# Patient Record
Sex: MALE | Race: WHITE | NOT HISPANIC OR LATINO | Employment: FULL TIME | URBAN - METROPOLITAN AREA
[De-identification: names, ages, dates, MRNs, and addresses within clinical notes are randomized per-mention and may not be internally consistent; named-entity substitution may affect disease eponyms.]

---

## 2020-03-02 ENCOUNTER — OFFICE VISIT (OUTPATIENT)
Dept: OPHTHALMOLOGY | Facility: CLINIC | Age: 40
End: 2020-03-02
Payer: COMMERCIAL

## 2020-03-02 DIAGNOSIS — Q89.8 STICKLER'S SYNDROME: ICD-10-CM

## 2020-03-02 DIAGNOSIS — H04.123 DRY EYE SYNDROME OF BOTH EYES: ICD-10-CM

## 2020-03-02 DIAGNOSIS — H40.32X3 GLAUCOMA OF LEFT EYE ASSOCIATED WITH OCULAR TRAUMA, SEVERE STAGE: Primary | ICD-10-CM

## 2020-03-02 DIAGNOSIS — Z96.1 PSEUDOPHAKIA: ICD-10-CM

## 2020-03-02 DIAGNOSIS — Z86.69 HISTORY OF RETINAL DETACHMENT: ICD-10-CM

## 2020-03-02 DIAGNOSIS — Z91.199 CURRENT NON-ADHERENCE TO MEDICAL TREATMENT: ICD-10-CM

## 2020-03-02 DIAGNOSIS — H54.10 BLINDNESS AND LOW VISION: ICD-10-CM

## 2020-03-02 PROCEDURE — 92020 GONIOSCOPY: CPT | Mod: S$GLB,,, | Performed by: OPHTHALMOLOGY

## 2020-03-02 PROCEDURE — 99999 PR PBB SHADOW E&M-NEW PATIENT-LVL II: CPT | Mod: PBBFAC,,, | Performed by: OPHTHALMOLOGY

## 2020-03-02 PROCEDURE — 92004 COMPRE OPH EXAM NEW PT 1/>: CPT | Mod: S$GLB,,, | Performed by: OPHTHALMOLOGY

## 2020-03-02 PROCEDURE — 92004 PR EYE EXAM, NEW PATIENT,COMPREHESV: ICD-10-PCS | Mod: S$GLB,,, | Performed by: OPHTHALMOLOGY

## 2020-03-02 PROCEDURE — 99999 PR PBB SHADOW E&M-NEW PATIENT-LVL II: ICD-10-PCS | Mod: PBBFAC,,, | Performed by: OPHTHALMOLOGY

## 2020-03-02 PROCEDURE — 92020 PR SPECIAL EYE EVAL,GONIOSCOPY: ICD-10-PCS | Mod: S$GLB,,, | Performed by: OPHTHALMOLOGY

## 2020-03-02 RX ORDER — BRIMONIDINE TARTRATE 2 MG/ML
1 SOLUTION/ DROPS OPHTHALMIC 2 TIMES DAILY
COMMUNITY
End: 2020-03-02

## 2020-03-02 RX ORDER — DORZOLAMIDE HYDROCHLORIDE AND TIMOLOL MALEATE 20; 5 MG/ML; MG/ML
1 SOLUTION/ DROPS OPHTHALMIC 2 TIMES DAILY
COMMUNITY
End: 2020-03-02 | Stop reason: SDUPTHER

## 2020-03-02 RX ORDER — BRIMONIDINE TARTRATE 2 MG/ML
1 SOLUTION/ DROPS OPHTHALMIC 2 TIMES DAILY
Qty: 30 ML | Refills: 4 | Status: SHIPPED | OUTPATIENT
Start: 2020-03-02 | End: 2020-08-26 | Stop reason: SDUPTHER

## 2020-03-02 RX ORDER — DORZOLAMIDE HYDROCHLORIDE AND TIMOLOL MALEATE 20; 5 MG/ML; MG/ML
1 SOLUTION/ DROPS OPHTHALMIC 2 TIMES DAILY
Qty: 30 ML | Refills: 4 | Status: SHIPPED | OUTPATIENT
Start: 2020-03-02 | End: 2020-08-26 | Stop reason: SDUPTHER

## 2020-03-02 NOTE — PROGRESS NOTES
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 (History)        Wife works at [employer]    Lost to FU 1/12/2016 --> 3/2/2020  No Drops x months  Discussed adherence  IOP & Blindness  Need for fu    Stickler Syndrome  Hx RD OU    OD: 2 yo with RD repair / amblyopia --> sp CE IOL elsewhere  OS: RD x 2 with PPVx / SB and SO --> re-detatches without SO  SO in place      Right eye --> not taking for months 9/21/2015  Alphagan BID  Combigan BID  Danielito q HS   Methazolamide 50 mg BID --> Improve tolerability --> no SOB / No Fatigue   Went back to Diamox 500 BID --> tolerating better --> off    PC IOL OU  Quiet  PCO OU  Observe as discussed      Left eye  ST BV / PPG with LMA 7/2/2015   Heavier conj scarring INF --> bound down ST as well  Anticipated tube opening 8/06/2015 --> opened 8/7/2015      Left eye --> restart  Alphagan BID  Cosopt BID  Danielito q HS --> Holding --> can add back  PF1% q day --> holding ? Steroid response ?  Diamox 500 BID --> Holding --> intolerant  A1% BID --> holding      Dry Eye Syndrome: discussed use of warm compresses, preserved & non-preserved artificial tears, gel and PM ointment options.  Also discussed options utilizing medications.        Plan  RTC 1 month IOP and re-establish care with retina service  RTC --> re-establish care with PCP / Tan  RTC sooner prn with good understanding

## 2020-04-15 ENCOUNTER — TELEPHONE (OUTPATIENT)
Dept: OPHTHALMOLOGY | Facility: CLINIC | Age: 40
End: 2020-04-15

## 2020-04-15 NOTE — TELEPHONE ENCOUNTER
----- Message from Denise Melendez sent at 4/15/2020  3:10 PM CDT -----  Contact: Soha (Mother)  Pt mother calling on behalf of her son to get a copy of the last visit summary emailed to him for insurance purposes.  The email is [email or web address].  If we are unable to email to him please contact the mother at [phone].  I informed that I would send a link for him to register on the [facility] as well.

## 2020-08-03 RX ORDER — VALACYCLOVIR HYDROCHLORIDE 1 G/1
1000 TABLET, FILM COATED ORAL 2 TIMES DAILY
Qty: 10 TABLET | Refills: 3 | Status: SHIPPED | OUTPATIENT
Start: 2020-08-03 | End: 2020-08-08

## 2020-08-26 ENCOUNTER — OFFICE VISIT (OUTPATIENT)
Dept: OPHTHALMOLOGY | Facility: CLINIC | Age: 40
End: 2020-08-26
Payer: COMMERCIAL

## 2020-08-26 DIAGNOSIS — H26.493 PCO (POSTERIOR CAPSULAR OPACIFICATION), BILATERAL: ICD-10-CM

## 2020-08-26 DIAGNOSIS — Z86.69 HISTORY OF RETINAL DETACHMENT: ICD-10-CM

## 2020-08-26 DIAGNOSIS — Z91.199 CURRENT NON-ADHERENCE TO MEDICAL TREATMENT: ICD-10-CM

## 2020-08-26 DIAGNOSIS — H40.32X3 GLAUCOMA OF LEFT EYE ASSOCIATED WITH OCULAR TRAUMA, SEVERE STAGE: ICD-10-CM

## 2020-08-26 DIAGNOSIS — Q89.8 STICKLER'S SYNDROME: Primary | ICD-10-CM

## 2020-08-26 DIAGNOSIS — H59.819 CHORIORETINAL SCAR AFTER RETINAL DETACHMENT SURGERY: ICD-10-CM

## 2020-08-26 PROCEDURE — 92134 CPTRZ OPH DX IMG PST SGM RTA: CPT | Mod: S$GLB,,, | Performed by: OPHTHALMOLOGY

## 2020-08-26 PROCEDURE — 99999 PR PBB SHADOW E&M-EST. PATIENT-LVL III: ICD-10-PCS | Mod: PBBFAC,,, | Performed by: OPHTHALMOLOGY

## 2020-08-26 PROCEDURE — 92201 OPSCPY EXTND RTA DRAW UNI/BI: CPT | Mod: S$GLB,,, | Performed by: OPHTHALMOLOGY

## 2020-08-26 PROCEDURE — 99999 PR PBB SHADOW E&M-EST. PATIENT-LVL III: CPT | Mod: PBBFAC,,, | Performed by: OPHTHALMOLOGY

## 2020-08-26 PROCEDURE — 92014 COMPRE OPH EXAM EST PT 1/>: CPT | Mod: S$GLB,,, | Performed by: OPHTHALMOLOGY

## 2020-08-26 PROCEDURE — 92014 PR EYE EXAM, EST PATIENT,COMPREHESV: ICD-10-PCS | Mod: S$GLB,,, | Performed by: OPHTHALMOLOGY

## 2020-08-26 PROCEDURE — 92134 POSTERIOR SEGMENT OCT RETINA (OCULAR COHERENCE TOMOGRAPHY)-BOTH EYES: ICD-10-PCS | Mod: S$GLB,,, | Performed by: OPHTHALMOLOGY

## 2020-08-26 PROCEDURE — 92201 PR OPHTHALMOSCOPY, EXT, W/RET DRAW/SCLERAL DEPR, I&R, UNI/BI: ICD-10-PCS | Mod: S$GLB,,, | Performed by: OPHTHALMOLOGY

## 2020-08-26 RX ORDER — BRIMONIDINE TARTRATE 2 MG/ML
1 SOLUTION/ DROPS OPHTHALMIC 2 TIMES DAILY
Qty: 30 ML | Refills: 4 | Status: SHIPPED | OUTPATIENT
Start: 2020-08-26 | End: 2023-07-31 | Stop reason: SDUPTHER

## 2020-08-26 RX ORDER — DORZOLAMIDE HYDROCHLORIDE AND TIMOLOL MALEATE 20; 5 MG/ML; MG/ML
1 SOLUTION/ DROPS OPHTHALMIC 2 TIMES DAILY
Qty: 30 ML | Refills: 4 | Status: SHIPPED | OUTPATIENT
Start: 2020-08-26 | End: 2021-11-22

## 2020-08-26 NOTE — LETTER
August 27, 2020      Heber Ferris MD  [address]  [facility] [address]           [facility] [address]  [address]  [facility] [address]  Phone: [phone]  Fax: [phone]          Patient: Gagandeep Boateng   MR Number: [id number]   YOB: 1980   Date of Visit: 8/26/2020       Dear Dr. Heber Ferris:    Thank you for referring Gagandeep Boateng to me for evaluation. Attached you will find relevant portions of my assessment and plan of care.    If you have questions, please do not hesitate to call me. I look forward to following Gagandeep Boateng along with you.    Sincerely,    Christopher Griffin MD    Enclosure  CC:  No Recipients    If you would like to receive this communication electronically, please contact [email or web address] or [phone] to request more information on [employer] access.    For providers and/or their staff who would like to refer a patient to [facility], please contact us through our one-stop-shop provider referral line, [facility], at [phone].    If you feel you have received this communication in error or would no longer like to receive these types of communications, please e-mail [email or web address]

## 2020-08-27 NOTE — PROGRESS NOTES
Subjective:       Patient ID: Gagandeep Boateng is a 40 y.o. male      Chief Complaint   Patient presents with    Concerns About Ocular Health    Referral     History of Present Illness  HPI     Retinal eval per Dr. Ferris   DLS-03/02/20 Dr. Ferris      Pt sts it has been about 2 or more years since seeing Retinal specialist   (Dr. Juárez) due to insurance changes.  Vision declining since last Retinal visit. Since Last [place] visit no   changes.   OS more glare and blurrier/worse vision in last years.  OS was better eye.    Cataract surgery OD improved vision somewhat in OD.   Now OD better-seeing eye for about 5 years  Denies pain, occasional headache.     (-)Flashes (+)Floaters rarely   (-)Photophobia  (+)Glare Mostly notices in OS       Gtts:  Alphagan BID OS  (Dr. Ferris following)  Cosopt BID OS  No gtts OD    [employer]  20 years          OD: 2 yo with RD repair / amblyopia with dense cataract  OS: RD x 2 with PPVx and SO --> re-detatches without SO   Seen/followed by Dr Eulalia Painter and Danny Martel    Stickler's syndrome  Pseudophakia  Nuclear cataract, right  Blindness and low vision  Glaucoma shunt device of left eye  Dry eye syndrome, bilateral    Last edited by Christopher Griffin MD on 8/26/2020 10:33 AM. (History)        Imaging:    See report    Assessment/Plan:         1. Stickler's syndrome  2. History of retinal detachment  3. 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  OD surgery age 3.  Previously better eye  OS attached with SO.  Will address with Dr Juárez to ensure notes correct about requiring SO OS  IOP elevated.  Not taking meds  Has early emulsifacation of SO.  May need SOR or exchange    4. Glaucoma shunt device of left eye  5. Current non-adherence to medical treatment  6. 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  Restart gtts.  If IOP acceptable next visit, will observe  If not consider  SOR or exchange, capsulotomy and gl procedure    - dorzolamide-timolol 2-0.5% (COSOPT) 22.3-6.8 mg/mL ophthalmic solution; Place 1 drop into the left eye 2 (two) times daily.  Dispense: 30 mL; Refill: 4  - brimonidine 0.2% (ALPHAGAN) 0.2 % Drop; Place 1 drop into the left eye 2 (two) times daily.  Dispense: 30 mL; Refill: 4    7. PCO OS>OD  May be contributing to poor vision OS.  Could consider YAG vs surgical capsulotomy    Follow up in about 1 week (around 9/2/2020), or if symptoms worsen or fail to improve, for Dilated examination, IOP checkc.

## 2020-09-03 ENCOUNTER — OFFICE VISIT (OUTPATIENT)
Dept: OPHTHALMOLOGY | Facility: CLINIC | Age: 40
End: 2020-09-03
Payer: COMMERCIAL

## 2020-09-03 DIAGNOSIS — H59.819 CHORIORETINAL SCAR AFTER RETINAL DETACHMENT SURGERY: ICD-10-CM

## 2020-09-03 DIAGNOSIS — H26.493 PCO (POSTERIOR CAPSULAR OPACIFICATION), BILATERAL: ICD-10-CM

## 2020-09-03 DIAGNOSIS — H40.32X3 GLAUCOMA OF LEFT EYE ASSOCIATED WITH OCULAR TRAUMA, SEVERE STAGE: Primary | ICD-10-CM

## 2020-09-03 DIAGNOSIS — Q89.8 STICKLER'S SYNDROME: ICD-10-CM

## 2020-09-03 DIAGNOSIS — Z86.69 HISTORY OF RETINAL DETACHMENT: ICD-10-CM

## 2020-09-03 DIAGNOSIS — Z91.199 CURRENT NON-ADHERENCE TO MEDICAL TREATMENT: ICD-10-CM

## 2020-09-03 PROCEDURE — 92012 INTRM OPH EXAM EST PATIENT: CPT | Mod: S$GLB,,, | Performed by: OPHTHALMOLOGY

## 2020-09-03 PROCEDURE — 99999 PR PBB SHADOW E&M-EST. PATIENT-LVL III: CPT | Mod: PBBFAC,,, | Performed by: OPHTHALMOLOGY

## 2020-09-03 PROCEDURE — 99999 PR PBB SHADOW E&M-EST. PATIENT-LVL III: ICD-10-PCS | Mod: PBBFAC,,, | Performed by: OPHTHALMOLOGY

## 2020-09-03 PROCEDURE — 92012 PR EYE EXAM, EST PATIENT,INTERMED: ICD-10-PCS | Mod: S$GLB,,, | Performed by: OPHTHALMOLOGY

## 2020-09-04 NOTE — PROGRESS NOTES
Subjective:       Patient ID: Gagandeep Boateng is a 40 y.o. male      Chief Complaint   Patient presents with    Concerns About Ocular Health     History of Present Illness  HPI     DLS-08/26/20 Dr. Griffin     Pt states that since last week vision is the same.  OS is less red and   intermittent ache is resolved.  Has bee using gtts consistently.    NO eye pain,Flashes (+)Floaters rarely     Gtts:  Alphagan BID OS  Cosopt BID OS          OD: 4 yo with RD repair / amblyopia with dense cataract  OS: RD x 2 with PPVx and SO --> re-detatches without SO   Seen/followed by Dr Eulalia Painter and Danny Martel    Stickler's syndrome  Pseudophakia  Nuclear cataract, right  Blindness and low vision  Glaucoma shunt device of left eye  Dry eye syndrome, bilateral    Last edited by Christopher Griffin MD on 9/4/2020  8:41 AM. (History)        Imaging:    See report    Assessment/Plan:         1. Stickler's syndrome  2. History of retinal detachment  3. 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  OD surgery age 3.  Previously better eye  OS attached with SO.  Will address with Dr Juárez to ensure notes correct about requiring SO OS if SOR becomes necessary  IOP improved now that back on gtts.    Has early emulsifacation of SO.  May need SOR or exchange in future    4. Glaucoma shunt device of left eye  5. Current non-adherence to medical treatment  6. 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  Dispense: 30 mL; Refill: 4  - brimonidine 0.2% (ALPHAGAN) 0.2 % Drop; Place 1 drop into the left eye 2 (two) times daily.  Dispense: 30 mL; Refill: 4    7. PCO OS>OD  May be contributing to poor vision OS.  Could consider YAG vs surgical  capsulotomy    Keep f/u with JDN also    Follow up in about 4 months (around 1/3/2021), or if symptoms worsen or fail to improve, for Comprehensive Examination, OCT Mac.

## 2021-04-16 ENCOUNTER — PATIENT MESSAGE (OUTPATIENT)
Dept: RESEARCH | Facility: HOSPITAL | Age: 41
End: 2021-04-16

## 2021-12-08 ENCOUNTER — TELEPHONE (OUTPATIENT)
Dept: ADMINISTRATIVE | Facility: OTHER | Age: 41
End: 2021-12-08
Payer: COMMERCIAL

## 2023-07-17 ENCOUNTER — TELEPHONE (OUTPATIENT)
Dept: OPHTHALMOLOGY | Facility: CLINIC | Age: 43
End: 2023-07-17
Payer: MEDICARE

## 2023-07-31 ENCOUNTER — OFFICE VISIT (OUTPATIENT)
Dept: OPHTHALMOLOGY | Facility: CLINIC | Age: 43
End: 2023-07-31
Payer: COMMERCIAL

## 2023-07-31 DIAGNOSIS — Z86.69 HISTORY OF RETINAL DETACHMENT: ICD-10-CM

## 2023-07-31 DIAGNOSIS — Q89.8 STICKLER'S SYNDROME: Primary | ICD-10-CM

## 2023-07-31 DIAGNOSIS — H59.813 CHORIORETINAL SCAR OF BOTH EYES AFTER SURGERY FOR DETACHMENT: ICD-10-CM

## 2023-07-31 DIAGNOSIS — H26.493 PCO (POSTERIOR CAPSULAR OPACIFICATION), BILATERAL: ICD-10-CM

## 2023-07-31 DIAGNOSIS — H40.32X3 GLAUCOMA OF LEFT EYE ASSOCIATED WITH OCULAR TRAUMA, SEVERE STAGE: ICD-10-CM

## 2023-07-31 DIAGNOSIS — Z91.199 CURRENT NON-ADHERENCE TO MEDICAL TREATMENT: ICD-10-CM

## 2023-07-31 PROCEDURE — 92201 OPSCPY EXTND RTA DRAW UNI/BI: CPT | Mod: S$PBB,,, | Performed by: OPHTHALMOLOGY

## 2023-07-31 PROCEDURE — 99999 PR PBB SHADOW E&M-EST. PATIENT-LVL III: CPT | Mod: PBBFAC,,, | Performed by: OPHTHALMOLOGY

## 2023-07-31 PROCEDURE — 99214 PR OFFICE/OUTPT VISIT, EST, LEVL IV, 30-39 MIN: ICD-10-PCS | Mod: S$PBB,,, | Performed by: OPHTHALMOLOGY

## 2023-07-31 PROCEDURE — 92134 CPTRZ OPH DX IMG PST SGM RTA: CPT | Mod: PBBFAC | Performed by: OPHTHALMOLOGY

## 2023-07-31 PROCEDURE — 99999 PR PBB SHADOW E&M-EST. PATIENT-LVL III: ICD-10-PCS | Mod: PBBFAC,,, | Performed by: OPHTHALMOLOGY

## 2023-07-31 PROCEDURE — 92134 POSTERIOR SEGMENT OCT RETINA (OCULAR COHERENCE TOMOGRAPHY)-BOTH EYES: ICD-10-PCS | Mod: 26,S$PBB,, | Performed by: OPHTHALMOLOGY

## 2023-07-31 PROCEDURE — 99214 OFFICE O/P EST MOD 30 MIN: CPT | Mod: S$PBB,,, | Performed by: OPHTHALMOLOGY

## 2023-07-31 PROCEDURE — 92201 OPSCPY EXTND RTA DRAW UNI/BI: CPT | Mod: PBBFAC | Performed by: OPHTHALMOLOGY

## 2023-07-31 PROCEDURE — 92201 PR OPHTHALMOSCOPY, EXT, W/RET DRAW/SCLERAL DEPR, I&R, UNI/BI: ICD-10-PCS | Mod: S$PBB,,, | Performed by: OPHTHALMOLOGY

## 2023-07-31 PROCEDURE — 99213 OFFICE O/P EST LOW 20 MIN: CPT | Mod: PBBFAC | Performed by: OPHTHALMOLOGY

## 2023-07-31 RX ORDER — DORZOLAMIDE HYDROCHLORIDE AND TIMOLOL MALEATE 20; 5 MG/ML; MG/ML
SOLUTION/ DROPS OPHTHALMIC
Qty: 30 ML | Refills: 4 | Status: SHIPPED | OUTPATIENT
Start: 2023-07-31

## 2023-07-31 RX ORDER — BRIMONIDINE TARTRATE 2 MG/ML
1 SOLUTION/ DROPS OPHTHALMIC 2 TIMES DAILY
Qty: 30 ML | Refills: 4 | Status: SHIPPED | OUTPATIENT
Start: 2023-07-31

## 2023-08-01 NOTE — PROGRESS NOTES
Subjective:       Patient ID: Gagandeep Boateng is a 43 y.o. male      Chief Complaint   Patient presents with    Follow-up     History of Present Illness  HPI    DLS-09/03/2020 Dr. Griffin     Pt c/o cloudy vision at a distance. Patient also having trouble with   reading things. But this is longstanding problem without significant   change    Gtts:  None for several months    OD: 4 yo with RD repair / amblyopia with dense cataract  OS: RD x 2 with PPVx and SO --> re-detatches without SO  Previously seen/followed by Dr Eulalia Painter and Danny Martel    Stickler's syndrome  Pseudophakia  Nuclear cataract, right  Blindness and low vision  Glaucoma shunt device of left eye  Dry eye syndrome, bilateral  Last edited by Christopher Griffin MD on 8/1/2023  1:15 PM.        Imaging:    See report    Assessment/Plan:     1. Stickler's syndrome  2. History of retinal detachment  3. 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  OD surgery age 3.  Previously better eye  OS attached with SO.  Per Dr Juárez's notes requires SO OS to remain attached  Has early emulsifacation of SO.  May need SOR or exchange in future    4. Glaucoma shunt device of left eye  5. Current non-adherence to medical treatment  6. 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  Rx sent to pharmacy    Can consider SOR or exchange, capsulotomy and gl procedure if IOP not controlled  CPM with gtts    - dorzolamide-timolol 2-0.5% (COSOPT) 22.3-6.8 mg/mL ophthalmic solution; INSTILL ONE DROP IN THE LEFT EYE TWO TIMES A DAY  Dispense: 30 mL; Refill: 4  - brimonidine 0.2% (ALPHAGAN) 0.2 % Drop; Place 1 drop into the left eye 2 (two) times daily.  Dispense: 30 mL; Refill: 4    7. PCO OS>OD  May be contributing to poor vision OS.    Can no longer get OCT OS and view decreasing  Unclear how much PCO contributes  Could consider YAG vs  surgical capsulotomy    F/u with [facility] to eval IOP after restarting gtts/eval for any other possible intervention since patient not compliant with gtts or f/u and comes in with high pressures    Follow up in about 6 months (around 1/31/2024), or if symptoms worsen or fail to improve, for Comprehensive Examination, OCT Mac.

## 2024-03-22 ENCOUNTER — HOSPITAL ENCOUNTER (EMERGENCY)
Facility: HOSPITAL | Age: 44
Discharge: HOME | End: 2024-03-22
Attending: EMERGENCY MEDICINE
Payer: MEDICARE

## 2024-03-22 VITALS
DIASTOLIC BLOOD PRESSURE: 86 MMHG | HEART RATE: 73 BPM | WEIGHT: 190 LBS | HEIGHT: 74 IN | BODY MASS INDEX: 24.38 KG/M2 | TEMPERATURE: 98 F | SYSTOLIC BLOOD PRESSURE: 152 MMHG | OXYGEN SATURATION: 100 % | RESPIRATION RATE: 20 BRPM

## 2024-03-22 DIAGNOSIS — R07.9 CHEST PAIN: ICD-10-CM

## 2024-03-22 LAB
ALBUMIN SERPL BCP-MCNC: 4.5 G/DL (ref 3.5–5.2)
ALP SERPL-CCNC: 53 U/L (ref 55–135)
ALT SERPL W/O P-5'-P-CCNC: 18 U/L (ref 10–44)
ANION GAP SERPL CALC-SCNC: 9 MMOL/L (ref 8–16)
AST SERPL-CCNC: 27 U/L (ref 10–40)
BASOPHILS # BLD AUTO: 0.06 K/UL (ref 0–0.2)
BASOPHILS NFR BLD: 0.4 % (ref 0–1.9)
BILIRUB SERPL-MCNC: 1.4 MG/DL (ref 0.1–1)
BNP SERPL-MCNC: 14 PG/ML (ref 0–99)
BUN SERPL-MCNC: 14 MG/DL (ref 6–20)
CALCIUM SERPL-MCNC: 9.7 MG/DL (ref 8.7–10.5)
CHLORIDE SERPL-SCNC: 101 MMOL/L (ref 95–110)
CO2 SERPL-SCNC: 26 MMOL/L (ref 23–29)
CREAT SERPL-MCNC: 1.1 MG/DL (ref 0.5–1.4)
D DIMER PPP IA.FEU-MCNC: 0.27 MG/L FEU
DIFFERENTIAL METHOD BLD: ABNORMAL
EOSINOPHIL # BLD AUTO: 0.1 K/UL (ref 0–0.5)
EOSINOPHIL NFR BLD: 0.7 % (ref 0–8)
ERYTHROCYTE [DISTWIDTH] IN BLOOD BY AUTOMATED COUNT: 12.5 % (ref 11.5–14.5)
EST. GFR  (NO RACE VARIABLE): >60 ML/MIN/1.73 M^2
GLUCOSE SERPL-MCNC: 88 MG/DL (ref 70–110)
HCT VFR BLD AUTO: 41.2 % (ref 40–54)
HGB BLD-MCNC: 14.1 G/DL (ref 14–18)
IMM GRANULOCYTES # BLD AUTO: 0.05 K/UL (ref 0–0.04)
IMM GRANULOCYTES NFR BLD AUTO: 0.4 % (ref 0–0.5)
LYMPHOCYTES # BLD AUTO: 1.8 K/UL (ref 1–4.8)
LYMPHOCYTES NFR BLD: 13 % (ref 18–48)
MCH RBC QN AUTO: 29.9 PG (ref 27–31)
MCHC RBC AUTO-ENTMCNC: 34.2 G/DL (ref 32–36)
MCV RBC AUTO: 87 FL (ref 82–98)
MONOCYTES # BLD AUTO: 0.8 K/UL (ref 0.3–1)
MONOCYTES NFR BLD: 5.7 % (ref 4–15)
NEUTROPHILS # BLD AUTO: 10.7 K/UL (ref 1.8–7.7)
NEUTROPHILS NFR BLD: 79.8 % (ref 38–73)
NRBC BLD-RTO: 0 /100 WBC
PLATELET # BLD AUTO: 262 K/UL (ref 150–450)
PMV BLD AUTO: 10.5 FL (ref 9.2–12.9)
POTASSIUM SERPL-SCNC: 4.3 MMOL/L (ref 3.5–5.1)
PROT SERPL-MCNC: 8.4 G/DL (ref 6–8.4)
RBC # BLD AUTO: 4.72 M/UL (ref 4.6–6.2)
SODIUM SERPL-SCNC: 136 MMOL/L (ref 136–145)
TROPONIN I SERPL DL<=0.01 NG/ML-MCNC: 0.01 NG/ML (ref 0–0.03)
TROPONIN I SERPL DL<=0.01 NG/ML-MCNC: <0.006 NG/ML (ref 0–0.03)
TROPONIN I SERPL DL<=0.01 NG/ML-MCNC: <0.006 NG/ML (ref 0–0.03)
WBC # BLD AUTO: 13.41 K/UL (ref 3.9–12.7)

## 2024-03-22 PROCEDURE — 85025 COMPLETE CBC W/AUTO DIFF WBC: CPT

## 2024-03-22 PROCEDURE — 80053 COMPREHEN METABOLIC PANEL: CPT

## 2024-03-22 PROCEDURE — 84484 ASSAY OF TROPONIN QUANT: CPT

## 2024-03-22 PROCEDURE — 83880 ASSAY OF NATRIURETIC PEPTIDE: CPT

## 2024-03-22 PROCEDURE — 93005 ELECTROCARDIOGRAM TRACING: CPT

## 2024-03-22 PROCEDURE — 99285 EMERGENCY DEPT VISIT HI MDM: CPT | Mod: 25

## 2024-03-22 PROCEDURE — 84484 ASSAY OF TROPONIN QUANT: CPT | Mod: 91 | Performed by: EMERGENCY MEDICINE

## 2024-03-22 PROCEDURE — 85379 FIBRIN DEGRADATION QUANT: CPT

## 2024-03-22 PROCEDURE — 93010 ELECTROCARDIOGRAM REPORT: CPT | Mod: ,,, | Performed by: INTERNAL MEDICINE

## 2024-03-22 NOTE — ED TRIAGE NOTES
"PT arrived with c/o CP, SOB, and dizziness starting about 1pm today.  Pt states, "It started out as a sharp pain to R ribs and R upper chest.  Then I started feeling heart racing and started sweating a lot.  I was SOB, nauseated, and lightheaded.  The episode lasted a few minutes."  Pt states he was just sitting a car when this started, states all he had to eat today was a banana.  Pt states at present he just feels "a little tired and a little soreness in his chest," no other symptoms.  Pt answering questions appropriately, speaking in complete sentences, respirations even and unlabored.  Aao x 4.    "

## 2024-03-23 NOTE — ED PROVIDER NOTES
Encounter Date: 3/22/2024       History     Chief Complaint   Patient presents with    Dizziness     Pt. Has a minute episode of a sharp pain in the (R) side of his chest with nausea, mild dizziness and sweating. Onset while riding in a car. Pt. States he has only eaten a Banana today.      43-year-old male with past medical history as below presents with complaint of chest pain and shortness of breath.  Patient says that around 3:00 p.m. today while he was a passenger in a car, he would onset of chest pain that started on the right side that became bilateral, with the associated breath and a sensation of lightheadedness.  Says that he never lost consciousness.  Says that the symptoms lasted for about 2-4 minutes and spontaneously resolved.  Denies fever, chills, cough, abdominal pain, vomiting, diarrhea.  Denies a prior cardiac history    The history is provided by the patient and the spouse.     Review of patient's allergies indicates:   Allergen Reactions    Benadryl [diphenhydramine hcl] Other (See Comments)     Due to patient's glaucoma per patient     Past Medical History:   Diagnosis Date    Cataract     Glaucoma     Retinal detachment      Past Surgical History:   Procedure Laterality Date    APPENDECTOMY      CATARACT EXTRACTION      RETINAL DETACHMENT SURGERY       Family History   Problem Relation Age of Onset    No Known Problems Mother     No Known Problems Father     No Known Problems Sister     No Known Problems Brother     No Known Problems Maternal Aunt     No Known Problems Maternal Uncle     No Known Problems Paternal Aunt     No Known Problems Paternal Uncle     No Known Problems Maternal Grandmother     No Known Problems Maternal Grandfather     No Known Problems Paternal Grandmother     No Known Problems Paternal Grandfather     Amblyopia Neg Hx     Blindness Neg Hx     Cancer Neg Hx     Cataracts Neg Hx     Diabetes Neg Hx     Glaucoma Neg Hx     Hypertension Neg Hx     Macular degeneration  Neg Hx     Retinal detachment Neg Hx     Strabismus Neg Hx     Stroke Neg Hx     Thyroid disease Neg Hx      Social History     Tobacco Use    Smoking status: Light Smoker   Substance Use Topics    Alcohol use: No     Review of Systems    Physical Exam     Initial Vitals [03/22/24 1632]   BP Pulse Resp Temp SpO2   (!) 152/86 73 20 98.3 °F (36.8 °C) 100 %      MAP       --         Physical Exam    Constitutional: He appears well-developed and well-nourished. He is not diaphoretic. No distress.   HENT:   Head: Normocephalic and atraumatic.   Eyes: EOM are normal.   Neck:   Normal range of motion.  Cardiovascular:  Normal rate, regular rhythm and intact distal pulses.           Pulmonary/Chest: Breath sounds normal. No respiratory distress. He has no wheezes.   Abdominal: Abdomen is soft. He exhibits no distension. There is no abdominal tenderness.   Musculoskeletal:         General: Normal range of motion.      Cervical back: Normal range of motion.     Neurological: He is alert and oriented to person, place, and time.   Skin: Skin is warm and dry. No rash noted.   Psychiatric: He has a normal mood and affect.         ED Course   Procedures  Labs Reviewed   CBC W/ AUTO DIFFERENTIAL - Abnormal; Notable for the following components:       Result Value    WBC 13.41 (*)     Gran # (ANC) 10.7 (*)     Immature Grans (Abs) 0.05 (*)     Gran % 79.8 (*)     Lymph % 13.0 (*)     All other components within normal limits   COMPREHENSIVE METABOLIC PANEL - Abnormal; Notable for the following components:    Total Bilirubin 1.4 (*)     Alkaline Phosphatase 53 (*)     All other components within normal limits   TROPONIN I   TROPONIN I   B-TYPE NATRIURETIC PEPTIDE   D DIMER, QUANTITATIVE   TROPONIN I     EKG Readings: (Independently Interpreted)   Initial Reading: No STEMI. Rhythm: Normal Sinus Rhythm. Heart Rate: 63. Ectopy: No Ectopy. Conduction: Normal. Axis: Normal. Clinical Impression: Normal Sinus Rhythm       Imaging Results               X-Ray Chest AP Portable (Final result)  Result time 03/22/24 19:26:37      Final result by Igor Álvarez DO (03/22/24 19:26:37)                   Impression:      No acute abnormality.      Electronically signed by: Igor Álvarez  Date:    03/22/2024  Time:    19:26               Narrative:    EXAMINATION:  XR CHEST AP PORTABLE    CLINICAL HISTORY:  Chest Pain;    TECHNIQUE:  Single frontal view of the chest was performed.    COMPARISON:  11/25/2015.    FINDINGS:  The lungs are well expanded and clear. No focal opacities are seen. The pleural spaces are clear. The cardiac silhouette is unremarkable.  The visualized osseous structures are unremarkable.                                       Medications - No data to display  Medical Decision Making  42 yo M p/w complaint of chest pain, dyspnea, lightheadness now resolved. AVSS. No evidence of volume overload or shock on exam. EKG without acute ischemic changes. Low suspicion for acute PE, pneumothorax, thoracic aortic dissection, cardiac effusion / tamponade. CXR without acute disease including on my independent interpretation. Troponin wnl including repeat 6 hours after onset of pain. No electrolyte abnormalities. HEART score low risk. Pt discharged with strict return precautions and outpatient f/u.  Referral placed for patient to establish primary care.     No acute emergent medical condition has been identified. The patient appears to be low risk for an emergent medical condition is appropriate for discharge with outpatient f/u as detailed in discharge instructions for reevaluation and possible continued outpatient workup and management. I have discussed the workup with the patient, who has verbalized understanding of the plan and need for outpatient follow-up.  This evaluation does not preclude the development of an emergent condition so in addition, I have advised the patient that they can return to the ED at any time with worsening or change of  their symptoms, or with any other medical complaint.         Amount and/or Complexity of Data Reviewed  External Data Reviewed: notes.     Details: Seen outpatient by ophthalmology   Labs: ordered. Decision-making details documented in ED Course.  Radiology: ordered and independent interpretation performed.  ECG/medicine tests: ordered and independent interpretation performed.    Risk  OTC drugs.  Prescription drug management.               ED Course as of 03/22/24 2141   Fri Mar 22, 2024   2000 CXR Impression:   No acute abnormality.   [AT]   2000 D-Dimer: 0.27  Normal  [AT]   2000 Troponin I: 0.012  Normal  [AT]   2000 Hemoglobin: 14.1  Normal  [AT]   2000 Creatinine: 1.1  Normal  [AT]   2138 Troponin I: <0.006  Repeat 6 hours after onset of pain wnl [AT]      ED Course User Index  [AT] Nica Porter MD                           Clinical Impression:  Final diagnoses:  [R07.9] Chest pain          ED Disposition Condition    Discharge Stable          ED Prescriptions    None       Follow-up Information       Follow up With Specialties Details Why Contact Info Additional Information    [facility]   [address], [address]  [facility] [address] [phone] Please park in [address] or D and use [address] entrance. Take Medical Office Bldg. elevators.    [facility] Emergency Dept Emergency Medicine  As needed, If symptoms worsen [address] [address] [phone]              Nica Porter MD  03/22/24 2141

## 2024-03-23 NOTE — DISCHARGE INSTRUCTIONS

## 2024-03-26 LAB
OHS QRS DURATION: 88 MS
OHS QTC CALCULATION: 405 MS

## 2024-04-03 ENCOUNTER — OFFICE VISIT (OUTPATIENT)
Dept: FAMILY MEDICINE | Facility: HOSPITAL | Age: 44
End: 2024-04-03
Attending: EMERGENCY MEDICINE
Payer: MEDICARE

## 2024-04-03 VITALS
WEIGHT: 202.38 LBS | TEMPERATURE: 98 F | OXYGEN SATURATION: 98 % | SYSTOLIC BLOOD PRESSURE: 126 MMHG | HEART RATE: 61 BPM | HEIGHT: 74 IN | BODY MASS INDEX: 25.97 KG/M2 | DIASTOLIC BLOOD PRESSURE: 87 MMHG

## 2024-04-03 DIAGNOSIS — R07.9 CHEST PAIN: ICD-10-CM

## 2024-04-03 DIAGNOSIS — Z76.89 ENCOUNTER TO ESTABLISH CARE: Primary | ICD-10-CM

## 2024-04-03 DIAGNOSIS — Z11.59 ENCOUNTER FOR HEPATITIS C SCREENING TEST FOR LOW RISK PATIENT: ICD-10-CM

## 2024-04-03 DIAGNOSIS — R00.2 PALPITATIONS: ICD-10-CM

## 2024-04-03 DIAGNOSIS — Z11.4 SCREENING FOR HIV WITHOUT PRESENCE OF RISK FACTORS: ICD-10-CM

## 2024-04-03 DIAGNOSIS — E74.89 OTHER SPECIFIED DISORDERS OF CARBOHYDRATE METABOLISM: ICD-10-CM

## 2024-04-03 PROCEDURE — 99214 OFFICE O/P EST MOD 30 MIN: CPT

## 2024-04-03 NOTE — PROGRESS NOTES
[facility]  History & Physical    SUBJECTIVE:     Chief Complaint:   Chief Complaint   Patient presents with    Chest Pain     ER visit 2 weeks ago felt sharp shooting pain w tightness and trouble breathing &  heavily sweating,        History of Present Illness:  43 y.o. male who  has a past medical history of Cataract, Glaucoma, and Retinal detachment. presents to clinic today to establish care.  Patient recently seen in the ED on March 22 due to chest pain.  Patient endorses he was sitting in his car when he suddenly felt right-sided chest pain in the center of the chest.  Pain was nonradiating and was reproducible with breathing.  Workup at the ED noted to be negative for any evidence of MI or PE.  Heart score done at that time noted to be low risk for any major cardiovascular event.  Patient denies any history of cardiac arrhythmia, MI or CVA.  Of note patient with history of Stickler syndrome.  Denies any history of valvular issue such as mitral valve prolapse which is associated with circular syndrome upon review.  Denies any episodes of chest pain ever since that time.  Endorses episodes of palpitations which occur intermittently for a couple of seconds last couple weeks.  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  PHQ-9 questionnaire noted to be 1 and RODRIGUE 7 noted to be 4.  Denies any SI, HI or hallucinations.    Past medical history:  Stickler syndrome and herpes.  Family history:  CAD in grandmother at approximately age 60.  Has 2 1st cousins with history of CAD/MI in early 50s and 30s respectively.    Social history:  Has 1 or 2 drinks every 2 weeks.  Denies any tobacco use.  Reports sporadic THC use.        Allergies:  Review of patient's allergies indicates:   Allergen Reactions    Benadryl [diphenhydramine hcl] Other (See Comments)     Due to patient's glaucoma per patient       Home Medications:  Current Outpatient Medications on File Prior to Visit   Medication Sig    brimonidine 0.2%  (ALPHAGAN) 0.2 % Drop Place 1 drop into the left eye 2 (two) times daily.    dorzolamide-timolol 2-0.5% (COSOPT) 22.3-6.8 mg/mL ophthalmic solution INSTILL ONE DROP IN THE LEFT EYE TWO TIMES A DAY    valACYclovir (VALTREX) 1000 MG tablet Take 1 tablet (1,000 mg total) by mouth 2 (two) times daily. for 5 days     No current facility-administered medications on file prior to visit.       Past Medical History:   Diagnosis Date    Cataract     Glaucoma     Retinal detachment      Past Surgical History:   Procedure Laterality Date    APPENDECTOMY      CATARACT EXTRACTION      RETINAL DETACHMENT SURGERY       Family History   Problem Relation Age of Onset    No Known Problems Mother     No Known Problems Father     No Known Problems Sister     No Known Problems Brother     No Known Problems Maternal Aunt     No Known Problems Maternal Uncle     No Known Problems Paternal Aunt     No Known Problems Paternal Uncle     No Known Problems Maternal Grandmother     No Known Problems Maternal Grandfather     No Known Problems Paternal Grandmother     No Known Problems Paternal Grandfather     Amblyopia Neg Hx     Blindness Neg Hx     Cancer Neg Hx     Cataracts Neg Hx     Diabetes Neg Hx     Glaucoma Neg Hx     Hypertension Neg Hx     Macular degeneration Neg Hx     Retinal detachment Neg Hx     Strabismus Neg Hx     Stroke Neg Hx     Thyroid disease Neg Hx      Social History     Tobacco Use    Smoking status: Light Smoker   Substance Use Topics    Alcohol use: No        Review of Systems   Constitutional:  Negative for chills and fever.   HENT:  Negative for congestion and sore throat.    Eyes:  Negative for blurred vision.   Respiratory:  Negative for cough, shortness of breath and wheezing.    Cardiovascular:  Negative for chest pain and leg swelling.   Gastrointestinal:  Negative for abdominal pain, nausea and vomiting.   Genitourinary:  Negative for dysuria.   Musculoskeletal:  Negative for joint pain and myalgias.   Skin:   "Negative for rash.   Neurological:  Negative for dizziness and headaches.        OBJECTIVE:     Vital Signs:  Temp: 98.1 °F (36.7 °C) (04/03/24 1326)  Pulse: 61 (04/03/24 1326)  BP: 126/87 (04/03/24 1326)  SpO2: 98 % (04/03/24 1326)    Physical Exam  Constitutional:       Appearance: Normal appearance. He is normal weight.   HENT:      Head: Normocephalic and atraumatic.   Eyes:      Extraocular Movements: Extraocular movements intact.      Pupils: Pupils are equal, round, and reactive to light.   Cardiovascular:      Rate and Rhythm: Normal rate and regular rhythm.      Pulses: Normal pulses.      Heart sounds: Normal heart sounds.   Pulmonary:      Effort: Pulmonary effort is normal.      Breath sounds: Normal breath sounds.   Abdominal:      General: Abdomen is flat. Bowel sounds are normal.      Palpations: Abdomen is soft.   Musculoskeletal:         General: Normal range of motion.      Cervical back: Normal range of motion and neck supple.   Skin:     General: Skin is warm and dry.   Neurological:      General: No focal deficit present.      Mental Status: He is alert and oriented to person, place, and time. Mental status is at baseline.   Psychiatric:         Mood and Affect: Mood normal.         Behavior: Behavior normal.       Laboratory:  No results found for: "HGBA1C"    A/P:  Gagandeep was seen today for chest pain.    Diagnoses and all orders for this visit:    Encounter to establish care  -Patient referred from ED after presenting with chest pain.  CBC and CMP done during recent ED visit reviewed.  Results unremarkable.  Upon chart review noted to have elevated total cholesterol levels in the past.  Currently not on any medications for management of cholesterol.  Plan to repeat lipid panel and order rest of annual labs.  -     Hemoglobin A1C; Future  -     Lipid Panel; Future    Chest pain  -Upon chart review no evidence of any MI or PE based on recent ED workup.  Heart score noted to be low risk.  No " recent episodes of chest pain ever since March of 2022.  No prior cardiac history on further questioning.  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  Tuan 7 done during today's visit noted to be 4.  Will continue to monitor for now.  -     Ambulatory referral/consult to [facility]  -     Lipid Panel; Future    Palpitations  -patient endorsing intermittent episodes during which he feels palpitations.  Most recent EKG notable for sinus rhythm with some aspects notable for sinus arrhythmia.  Will repeat EKG and order TSH.  -     EKG 12-lead; Future  -     TSH; Future    Encounter for hepatitis C screening test for low risk patient  -     Hepatitis C Antibody; Future    Screening for HIV without presence of risk factors  -     HIV 1/2 Ag/Ab (4th Gen); Future    Other specified disorders of carbohydrate metabolism  -     Hemoglobin A1C; Future      DISCLAIMER: This note was partially created using [employer] Voice Recognition software. Typographical and content errors may occur with this process. While efforts are made to detect and correct such errors, in some cases errors will persist. For this reason, wording in this document should be considered in the proper context and not strictly verbatim.      Follow up in about 1 month (around 5/3/2024).        Dary Sandy MD  [facility], PGY-2  04/05/2024

## 2024-04-05 ENCOUNTER — LAB VISIT (OUTPATIENT)
Dept: LAB | Facility: HOSPITAL | Age: 44
End: 2024-04-05
Payer: MEDICARE

## 2024-04-05 DIAGNOSIS — Z11.4 SCREENING FOR HIV WITHOUT PRESENCE OF RISK FACTORS: ICD-10-CM

## 2024-04-05 DIAGNOSIS — Z11.59 ENCOUNTER FOR HEPATITIS C SCREENING TEST FOR LOW RISK PATIENT: ICD-10-CM

## 2024-04-05 DIAGNOSIS — R07.9 CHEST PAIN: ICD-10-CM

## 2024-04-05 DIAGNOSIS — E74.89 OTHER SPECIFIED DISORDERS OF CARBOHYDRATE METABOLISM: ICD-10-CM

## 2024-04-05 DIAGNOSIS — R00.2 PALPITATIONS: ICD-10-CM

## 2024-04-05 DIAGNOSIS — Z76.89 ENCOUNTER TO ESTABLISH CARE: ICD-10-CM

## 2024-04-05 LAB
CHOLEST SERPL-MCNC: 228 MG/DL (ref 120–199)
CHOLEST/HDLC SERPL: 5.6 {RATIO} (ref 2–5)
ESTIMATED AVG GLUCOSE: 105 MG/DL (ref 68–131)
HBA1C MFR BLD: 5.3 % (ref 4–5.6)
HCV AB SERPL QL IA: NORMAL
HDLC SERPL-MCNC: 41 MG/DL (ref 40–75)
HDLC SERPL: 18 % (ref 20–50)
HIV 1+2 AB+HIV1 P24 AG SERPL QL IA: NORMAL
LDLC SERPL CALC-MCNC: 169.8 MG/DL (ref 63–159)
NONHDLC SERPL-MCNC: 187 MG/DL
TRIGL SERPL-MCNC: 86 MG/DL (ref 30–150)
TSH SERPL DL<=0.005 MIU/L-ACNC: 1.55 UIU/ML (ref 0.4–4)

## 2024-04-05 PROCEDURE — 84443 ASSAY THYROID STIM HORMONE: CPT

## 2024-04-05 PROCEDURE — 80061 LIPID PANEL: CPT

## 2024-04-05 PROCEDURE — 87389 HIV-1 AG W/HIV-1&-2 AB AG IA: CPT

## 2024-04-05 PROCEDURE — 36415 COLL VENOUS BLD VENIPUNCTURE: CPT

## 2024-04-05 PROCEDURE — 83036 HEMOGLOBIN GLYCOSYLATED A1C: CPT

## 2024-04-05 PROCEDURE — 86803 HEPATITIS C AB TEST: CPT

## 2024-04-05 NOTE — PROGRESS NOTES
I assume primary medical responsibility for this patient. I have reviewed the history, physical, and assessement & treatment plan with the resident and agree that the care is reasonable and necessary. This service has been performed by a resident without the presence of a teaching physician under the primary care exception. If necessary, an addendum of additional findings or evaluation beyond the resident documentation will be noted below.    Agree with conservative approach to new onset chest discomfort with ACS ruled out in ER.